# Patient Record
Sex: MALE | Race: WHITE | Employment: OTHER | URBAN - METROPOLITAN AREA
[De-identification: names, ages, dates, MRNs, and addresses within clinical notes are randomized per-mention and may not be internally consistent; named-entity substitution may affect disease eponyms.]

---

## 2017-01-27 ENCOUNTER — OFFICE VISIT (OUTPATIENT)
Dept: INTERNAL MEDICINE | Age: 77
End: 2017-01-27

## 2017-01-27 VITALS
WEIGHT: 263 LBS | BODY MASS INDEX: 37.65 KG/M2 | HEIGHT: 70 IN | DIASTOLIC BLOOD PRESSURE: 80 MMHG | RESPIRATION RATE: 13 BRPM | HEART RATE: 70 BPM | SYSTOLIC BLOOD PRESSURE: 122 MMHG

## 2017-01-27 DIAGNOSIS — I10 ESSENTIAL HYPERTENSION: Primary | ICD-10-CM

## 2017-01-27 DIAGNOSIS — R27.0 ATAXIA: ICD-10-CM

## 2017-01-27 DIAGNOSIS — E78.5 HYPERLIPIDEMIA, UNSPECIFIED HYPERLIPIDEMIA TYPE: ICD-10-CM

## 2017-01-27 DIAGNOSIS — E11.9 TYPE 2 DIABETES MELLITUS WITHOUT COMPLICATION, WITHOUT LONG-TERM CURRENT USE OF INSULIN (HCC): ICD-10-CM

## 2017-01-27 DIAGNOSIS — J98.6 DIAPHRAGM DYSFUNCTION: ICD-10-CM

## 2017-01-27 PROCEDURE — G8510 SCR DEP NEG, NO PLAN REQD: HCPCS | Performed by: INTERNAL MEDICINE

## 2017-01-27 PROCEDURE — 99213 OFFICE O/P EST LOW 20 MIN: CPT | Performed by: INTERNAL MEDICINE

## 2017-01-27 PROCEDURE — 3288F FALL RISK ASSESSMENT DOCD: CPT | Performed by: INTERNAL MEDICINE

## 2017-01-27 ASSESSMENT — PATIENT HEALTH QUESTIONNAIRE - PHQ9
2. FEELING DOWN, DEPRESSED OR HOPELESS: 0
SUM OF ALL RESPONSES TO PHQ9 QUESTIONS 1 & 2: 0
1. LITTLE INTEREST OR PLEASURE IN DOING THINGS: 0
SUM OF ALL RESPONSES TO PHQ QUESTIONS 1-9: 0

## 2017-01-30 RX ORDER — LORAZEPAM 1 MG/1
1 TABLET ORAL NIGHTLY PRN
Qty: 90 TABLET | Refills: 0 | Status: SHIPPED | OUTPATIENT
Start: 2017-01-30 | End: 2017-09-18 | Stop reason: SDUPTHER

## 2017-03-01 RX ORDER — LISINOPRIL 40 MG/1
TABLET ORAL
Qty: 180 TABLET | Refills: 3 | Status: SHIPPED | OUTPATIENT
Start: 2017-03-01 | End: 2017-10-03 | Stop reason: ALTCHOICE

## 2017-03-01 RX ORDER — EZETIMIBE 10 MG/1
TABLET ORAL
Qty: 90 TABLET | Refills: 3 | Status: SHIPPED | OUTPATIENT
Start: 2017-03-01 | End: 2018-01-31 | Stop reason: SDUPTHER

## 2017-03-01 RX ORDER — TAMSULOSIN HYDROCHLORIDE 0.4 MG/1
0.4 CAPSULE ORAL 2 TIMES DAILY
Qty: 180 CAPSULE | Refills: 3 | Status: SHIPPED | OUTPATIENT
Start: 2017-03-01 | End: 2017-09-18 | Stop reason: ALTCHOICE

## 2017-04-28 ENCOUNTER — OFFICE VISIT (OUTPATIENT)
Dept: INTERNAL MEDICINE | Age: 77
End: 2017-04-28

## 2017-04-28 VITALS
WEIGHT: 261 LBS | BODY MASS INDEX: 37.37 KG/M2 | HEART RATE: 70 BPM | DIASTOLIC BLOOD PRESSURE: 80 MMHG | HEIGHT: 70 IN | SYSTOLIC BLOOD PRESSURE: 130 MMHG | RESPIRATION RATE: 12 BRPM

## 2017-04-28 DIAGNOSIS — Z12.5 SPECIAL SCREENING FOR MALIGNANT NEOPLASM OF PROSTATE: ICD-10-CM

## 2017-04-28 DIAGNOSIS — E78.5 HYPERLIPIDEMIA, UNSPECIFIED HYPERLIPIDEMIA TYPE: ICD-10-CM

## 2017-04-28 DIAGNOSIS — R06.02 SHORTNESS OF BREATH: ICD-10-CM

## 2017-04-28 DIAGNOSIS — I10 ESSENTIAL HYPERTENSION: ICD-10-CM

## 2017-04-28 DIAGNOSIS — E11.9 TYPE 2 DIABETES MELLITUS WITHOUT COMPLICATION, WITHOUT LONG-TERM CURRENT USE OF INSULIN (HCC): ICD-10-CM

## 2017-04-28 DIAGNOSIS — M79.89 BILATERAL SWELLING OF FEET: Primary | ICD-10-CM

## 2017-04-28 LAB
CREATININE URINE: 86.3 MG/DL (ref 39–259)
MICROALBUMIN UR-MCNC: 11.8 MG/DL
MICROALBUMIN/CREAT UR-RTO: 136.7 MG/G (ref 0–30)

## 2017-04-28 PROCEDURE — 99214 OFFICE O/P EST MOD 30 MIN: CPT | Performed by: INTERNAL MEDICINE

## 2017-05-08 ENCOUNTER — HOSPITAL ENCOUNTER (OUTPATIENT)
Dept: NON INVASIVE DIAGNOSTICS | Age: 77
Discharge: HOME | End: 2017-05-08
Attending: INTERNAL MEDICINE | Admitting: INTERNAL MEDICINE

## 2017-05-08 DIAGNOSIS — R06.02 SHORTNESS OF BREATH: ICD-10-CM

## 2017-05-08 LAB
LV EF: 58 %
LVEF MODALITY: NORMAL

## 2017-05-12 ENCOUNTER — HOSPITAL ENCOUNTER (OUTPATIENT)
Dept: OTHER | Age: 77
Discharge: HOME | End: 2017-05-12
Attending: INTERNAL MEDICINE | Admitting: INTERNAL MEDICINE

## 2017-05-12 LAB
ANION GAP SERPL CALCULATED.3IONS-SCNC: 12 MMOL/L (ref 3–16)
BUN BLDV-MCNC: 27 MG/DL (ref 7–20)
CALCIUM SERPL-MCNC: 10.7 MG/DL (ref 8.3–10.6)
CHLORIDE BLD-SCNC: 101 MMOL/L (ref 99–110)
CO2: 27 MMOL/L (ref 21–32)
CREAT SERPL-MCNC: 1 MG/DL (ref 0.8–1.3)
GFR AFRICAN AMERICAN: >60
GFR NON-AFRICAN AMERICAN: >60
GLUCOSE BLD-MCNC: 138 MG/DL (ref 70–99)
POTASSIUM SERPL-SCNC: 4.8 MMOL/L (ref 3.5–5.1)
PROSTATE SPECIFIC ANTIGEN: 1.36 NG/ML (ref 0–4)
SODIUM BLD-SCNC: 140 MMOL/L (ref 136–145)

## 2017-05-13 LAB
ESTIMATED AVERAGE GLUCOSE: 180 MG/DL
HBA1C MFR BLD: 7.9 %

## 2017-06-01 ENCOUNTER — TELEPHONE (OUTPATIENT)
Dept: INTERNAL MEDICINE | Age: 77
End: 2017-06-01

## 2017-06-01 RX ORDER — ATORVASTATIN CALCIUM 80 MG/1
80 TABLET, FILM COATED ORAL DAILY
Qty: 90 TABLET | Refills: 3 | Status: SHIPPED | OUTPATIENT
Start: 2017-06-01 | End: 2018-04-19 | Stop reason: SDUPTHER

## 2017-07-10 ENCOUNTER — TELEPHONE (OUTPATIENT)
Dept: INTERNAL MEDICINE | Age: 77
End: 2017-07-10

## 2017-09-18 ENCOUNTER — OFFICE VISIT (OUTPATIENT)
Dept: INTERNAL MEDICINE | Age: 77
End: 2017-09-18

## 2017-09-18 VITALS
DIASTOLIC BLOOD PRESSURE: 70 MMHG | WEIGHT: 271 LBS | BODY MASS INDEX: 38.8 KG/M2 | SYSTOLIC BLOOD PRESSURE: 130 MMHG | HEART RATE: 72 BPM | HEIGHT: 70 IN | RESPIRATION RATE: 12 BRPM

## 2017-09-18 DIAGNOSIS — E11.9 TYPE 2 DIABETES MELLITUS WITHOUT COMPLICATION, WITHOUT LONG-TERM CURRENT USE OF INSULIN (HCC): ICD-10-CM

## 2017-09-18 DIAGNOSIS — R05.9 COUGH: ICD-10-CM

## 2017-09-18 DIAGNOSIS — I10 ESSENTIAL HYPERTENSION: ICD-10-CM

## 2017-09-18 DIAGNOSIS — I65.29 STENOSIS OF CAROTID ARTERY, UNSPECIFIED LATERALITY: ICD-10-CM

## 2017-09-18 DIAGNOSIS — E78.5 HYPERLIPIDEMIA, UNSPECIFIED HYPERLIPIDEMIA TYPE: ICD-10-CM

## 2017-09-18 DIAGNOSIS — R06.02 SOB (SHORTNESS OF BREATH): Primary | ICD-10-CM

## 2017-09-18 PROCEDURE — 99214 OFFICE O/P EST MOD 30 MIN: CPT | Performed by: INTERNAL MEDICINE

## 2017-09-18 RX ORDER — TADALAFIL 10 MG/1
10 TABLET ORAL PRN
COMMUNITY

## 2017-09-18 RX ORDER — LORAZEPAM 1 MG/1
1 TABLET ORAL NIGHTLY PRN
Qty: 90 TABLET | Refills: 0 | Status: SHIPPED | OUTPATIENT
Start: 2017-09-18 | End: 2018-12-17

## 2017-09-18 RX ORDER — FUROSEMIDE 40 MG/1
40 TABLET ORAL DAILY
Qty: 90 TABLET | Refills: 3 | Status: SHIPPED | OUTPATIENT
Start: 2017-09-18 | End: 2020-06-23 | Stop reason: SDUPTHER

## 2017-09-18 RX ORDER — SILODOSIN 8 MG/1
8 CAPSULE ORAL EVERY EVENING
COMMUNITY

## 2017-09-19 ENCOUNTER — HOSPITAL ENCOUNTER (OUTPATIENT)
Dept: VASCULAR LAB | Age: 77
Discharge: HOME | End: 2017-09-19
Attending: INTERNAL MEDICINE | Admitting: INTERNAL MEDICINE

## 2017-09-19 ENCOUNTER — HOSPITAL ENCOUNTER (OUTPATIENT)
Dept: OTHER | Age: 77
Discharge: HOME | End: 2017-09-19
Attending: INTERNAL MEDICINE | Admitting: INTERNAL MEDICINE

## 2017-09-19 DIAGNOSIS — I65.29 STENOSIS OF CAROTID ARTERY, UNSPECIFIED LATERALITY: Primary | ICD-10-CM

## 2017-09-19 DIAGNOSIS — I65.29 OCCLUSION AND STENOSIS OF UNSPECIFIED CAROTID ARTERY: ICD-10-CM

## 2017-09-19 DIAGNOSIS — R05.9 COUGH: ICD-10-CM

## 2017-09-19 DIAGNOSIS — R06.02 SOB (SHORTNESS OF BREATH): ICD-10-CM

## 2017-09-19 LAB
ALT SERPL-CCNC: 23 U/L (ref 10–40)
ANION GAP SERPL CALCULATED.3IONS-SCNC: 9 MMOL/L (ref 3–16)
AST SERPL-CCNC: 20 U/L (ref 15–37)
BUN BLDV-MCNC: 22 MG/DL (ref 7–20)
CALCIUM SERPL-MCNC: 10.2 MG/DL (ref 8.3–10.6)
CHLORIDE BLD-SCNC: 103 MMOL/L (ref 99–110)
CHOLESTEROL, FASTING: 129 MG/DL (ref 0–199)
CO2: 28 MMOL/L (ref 21–32)
CREAT SERPL-MCNC: 0.9 MG/DL (ref 0.8–1.3)
GFR AFRICAN AMERICAN: >60
GFR NON-AFRICAN AMERICAN: >60
GLUCOSE FASTING: 132 MG/DL (ref 70–99)
HDLC SERPL-MCNC: 44 MG/DL (ref 40–60)
LDL CHOLESTEROL CALCULATED: 68 MG/DL
POTASSIUM SERPL-SCNC: 4.7 MMOL/L (ref 3.5–5.1)
SODIUM BLD-SCNC: 140 MMOL/L (ref 136–145)
TOTAL CK: 84 U/L (ref 39–308)
TRIGLYCERIDE, FASTING: 86 MG/DL (ref 0–150)
VLDLC SERPL CALC-MCNC: 17 MG/DL

## 2017-09-20 LAB
ESTIMATED AVERAGE GLUCOSE: 168.6 MG/DL
HBA1C MFR BLD: 7.5 %

## 2017-10-03 ENCOUNTER — OFFICE VISIT (OUTPATIENT)
Dept: CARDIOLOGY CLINIC | Age: 77
End: 2017-10-03

## 2017-10-03 VITALS
HEART RATE: 81 BPM | BODY MASS INDEX: 38.31 KG/M2 | WEIGHT: 267 LBS | SYSTOLIC BLOOD PRESSURE: 150 MMHG | DIASTOLIC BLOOD PRESSURE: 80 MMHG

## 2017-10-03 DIAGNOSIS — I10 ESSENTIAL HYPERTENSION: ICD-10-CM

## 2017-10-03 DIAGNOSIS — E78.5 HYPERLIPIDEMIA, UNSPECIFIED HYPERLIPIDEMIA TYPE: Primary | ICD-10-CM

## 2017-10-03 LAB
BASOPHILS ABSOLUTE: 0 K/UL (ref 0–0.2)
BASOPHILS RELATIVE PERCENT: 0.2 %
EOSINOPHILS ABSOLUTE: 0.1 K/UL (ref 0–0.6)
EOSINOPHILS RELATIVE PERCENT: 1.5 %
HCT VFR BLD CALC: 41.8 % (ref 40.5–52.5)
HEMOGLOBIN: 14.2 G/DL (ref 13.5–17.5)
LYMPHOCYTES ABSOLUTE: 2 K/UL (ref 1–5.1)
LYMPHOCYTES RELATIVE PERCENT: 26.8 %
MCH RBC QN AUTO: 31.5 PG (ref 26–34)
MCHC RBC AUTO-ENTMCNC: 33.9 G/DL (ref 31–36)
MCV RBC AUTO: 93 FL (ref 80–100)
MONOCYTES ABSOLUTE: 0.7 K/UL (ref 0–1.3)
MONOCYTES RELATIVE PERCENT: 9.3 %
NEUTROPHILS ABSOLUTE: 4.6 K/UL (ref 1.7–7.7)
NEUTROPHILS RELATIVE PERCENT: 62.2 %
PDW BLD-RTO: 13.9 % (ref 12.4–15.4)
PLATELET # BLD: 157 K/UL (ref 135–450)
PMV BLD AUTO: 11.9 FL (ref 5–10.5)
RBC # BLD: 4.49 M/UL (ref 4.2–5.9)
T4 TOTAL: 7.5 UG/DL (ref 4.5–10.9)
WBC # BLD: 7.4 K/UL (ref 4–11)

## 2017-10-03 PROCEDURE — 93000 ELECTROCARDIOGRAM COMPLETE: CPT | Performed by: INTERNAL MEDICINE

## 2017-10-03 PROCEDURE — 99213 OFFICE O/P EST LOW 20 MIN: CPT | Performed by: INTERNAL MEDICINE

## 2017-10-03 NOTE — MR AVS SNAPSHOT
After Visit Summary             Сергей Belle   10/3/2017 10:45 AM   Office Visit    Description:  Male : 1940   Provider:  Ginette Candelario MD   Department:  [address] and Future Appointments         Below is a list of your follow-up and future appointments. This may not be a complete list as you may have made appointments directly with providers that we are not aware of or your providers may have made some for you. Please call your providers to confirm appointments. It is important to keep your appointments. Please bring your current insurance card, photo ID, co-pay, and all medication bottles to your appointment. If self-pay, payment is expected at the time of service. Your To-Do List     Future Appointments Provider Department Dept Phone    2017 10:15 AM Ginette Candelario, [address] [phone]    Please arrive 15 minutes prior to appointment, bring photo ID and insurance card. Information from Your Visit        Department     Name Address Phone Fax    [address]  [place] [address] [phone]      You Were Seen for:         Comments    Hyperlipidemia, unspecified hyperlipidemia type   [[id number]]         Vital Signs     Blood Pressure Pulse Weight Body Mass Index Smoking Status       150/80 81 267 lb (121.1 kg) 38.31 kg/m2 Former Smoker       Additional Information about your Body Mass Index (BMI)           Your BMI as listed above is considered obese (30 or more). BMI is an estimate of body fat, calculated from your height and weight. The higher your BMI, the greater your risk of heart disease, high blood pressure, type 2 diabetes, stroke, gallstones, arthritis, sleep apnea, and certain cancers. BMI is not perfect. It may overestimate body fat in athletes and people who are more muscular.   Even a small weight loss (between 5 and 10 Basic Information     Date Of Birth Sex Race Ethnicity Preferred Language    1940 Male White Non-/Non  English      Problem List as of 10/3/2017  Date Reviewed: 9/18/2017                Occlusion and stenosis of carotid artery without mention of cerebral infarction    Diaphragm dysfunction    Type 2 diabetes mellitus without complication ([facility] [address]    Hypertension    Hyperlipidemia      Immunizations as of 10/3/2017     Name Date    Influenza Vaccine, unspecified formulation 12/20/2015    Influenza, High Dose 10/15/2016    Pneumococcal 13-valent Conjugate (Asuziec32) 1/23/2017    Pneumococcal Polysaccharide ([id number]) 12/6/2010    Td 12/9/2005    Zoster 3/4/2013      Preventive Care        Date Due    Tetanus Combination Vaccine (1 - Tdap) 12/10/2005    Yearly Flu Vaccine (1) 9/1/2017    Colonoscopy 11/1/2017    Cholesterol Screening 9/19/2022            MyChart Signup           [employer] allows you to send messages to your doctor, view your test results, renew your prescriptions, schedule appointments, view visit notes, and more. How Do I Sign Up? 1. In your Internet browser, go to [email or web address]  2. Click on the Sign Up Now link in the Sign In box. You will see the New Member Sign Up page. 3. Enter your [employer] Access Code exactly as it appears below. You will not need to use this code after youve completed the sign-up process. If you do not sign up before the expiration date, you must request a new code. [employer] Access Code: [email or web address]  Expires: 11/17/2017  2:57 PM    4. Enter your Social Security Number ([id number]) and Date of Birth (mm/dd/yyyy) as indicated and click Submit. You will be taken to the next sign-up page. 5. Create a [employer] ID. This will be your [employer] login ID and cannot be changed, so think of one that is secure and easy to remember. 6. Create a [employer] password. You can change your password at any time. 7. Enter your Password Reset Question and Answer. This can be used at a later time if you forget your password. 8. Enter your e-mail address. You will receive e-mail notification when new information is available in [address] 9. Click Sign Up. You can now view your medical record. Additional Information  If you have questions, please contact the physician practice where you receive care. Remember, [employer] is NOT to be used for urgent needs. For medical emergencies, dial 911. For questions regarding your [employer] account call [phone]. If you have a clinical question, please call your doctor's office.

## 2017-10-04 RX ORDER — LOSARTAN POTASSIUM 100 MG/1
100 TABLET ORAL DAILY
Qty: 30 TABLET | Refills: 3 | Status: SHIPPED | OUTPATIENT
Start: 2017-10-04 | End: 2017-11-14 | Stop reason: SDUPTHER

## 2017-10-17 NOTE — PROGRESS NOTES
[address]     Outpatient Follow Up Note    Subjective:   CHIEF COMPLAINT / HPI:  Dyspnea     Сергей Belle  presents today  for evaluation of dyspnea. He is active but has to stop to catch his breath at times. This is not really associated with chest pain. Multiple risks for CAD. Prior stress test 2 years ago and essentially normal echo this year. He is overweight and BS control is erratic. Past Medical History:    Past Medical History:   Diagnosis Date    Allergic rhinitis     Arthritis     Atrophic kidney     left    Basal cell carcinoma 2014    Dr. Bang Daily - left cheek    Diaphragm dysfunction 2014    By echocardiogram    Diverticulosis     GERD (gastroesophageal reflux disease)     Hemorrhoids     Hyperlipidemia     Hypertension     Hypertension     Internal carotid artery stenosis 2014    R - 80-99%%, L - < 50%    NIDDM (non-insulin dependent diabetes mellitus)     Raynaud phenomenon     Sleep apnea     does not use CPAP       Social History:    History   Smoking Status    Former Smoker    Packs/day: 3.00    Years: 20.00    Types: Cigarettes    Quit date: 1979   Smokeless Tobacco    Never Used         Family History   Problem Relation Age of Onset    Other Father 68     , CVA.  Other Mother 80     , dementia, pernicious anemia.        Current Medications:  Current Outpatient Prescriptions on File Prior to Visit   Medication Sig Dispense Refill    silodosin (RAPAFLO) 8 MG CAPS Take 8 mg by mouth every evening      tadalafil (CIALIS) 10 MG tablet Take 10 mg by mouth as needed for Erectile Dysfunction      LORazepam (ATIVAN) 1 MG tablet Take 1 tablet by mouth nightly as needed (once nightly) 90 tablet 0    furosemide (LASIX) 40 MG tablet Take 1 tablet by mouth daily 90 tablet 3    atorvastatin (LIPITOR) 80 MG tablet Take 1 tablet by mouth daily 90 tablet 3    ezetimibe (ZETIA) 10 MG tablet TAKE 1 TABLET BY MOUTH     EVERY DAY 90

## 2017-10-23 RX ORDER — LORAZEPAM 1 MG/1
1 TABLET ORAL NIGHTLY PRN
Qty: 90 TABLET | Refills: 0 | Status: CANCELLED | OUTPATIENT
Start: 2017-10-23

## 2017-11-14 ENCOUNTER — OFFICE VISIT (OUTPATIENT)
Dept: CARDIOLOGY CLINIC | Age: 77
End: 2017-11-14

## 2017-11-14 VITALS
BODY MASS INDEX: 37.74 KG/M2 | DIASTOLIC BLOOD PRESSURE: 80 MMHG | HEART RATE: 60 BPM | WEIGHT: 263 LBS | SYSTOLIC BLOOD PRESSURE: 112 MMHG

## 2017-11-14 DIAGNOSIS — I10 ESSENTIAL HYPERTENSION: ICD-10-CM

## 2017-11-14 DIAGNOSIS — I65.29 OCCLUSION AND STENOSIS OF CAROTID ARTERY: Primary | ICD-10-CM

## 2017-11-14 DIAGNOSIS — E78.5 HYPERLIPIDEMIA, UNSPECIFIED HYPERLIPIDEMIA TYPE: ICD-10-CM

## 2017-11-14 LAB
ALBUMIN SERPL-MCNC: 4 G/DL (ref 3.4–5)
ANION GAP SERPL CALCULATED.3IONS-SCNC: 10 MMOL/L (ref 3–16)
BASOPHILS ABSOLUTE: 0 K/UL (ref 0–0.2)
BASOPHILS RELATIVE PERCENT: 0.2 %
BUN BLDV-MCNC: 24 MG/DL (ref 7–20)
CALCIUM SERPL-MCNC: 10.7 MG/DL (ref 8.3–10.6)
CHLORIDE BLD-SCNC: 99 MMOL/L (ref 99–110)
CO2: 31 MMOL/L (ref 21–32)
CREAT SERPL-MCNC: 0.8 MG/DL (ref 0.8–1.3)
EOSINOPHILS ABSOLUTE: 0.1 K/UL (ref 0–0.6)
EOSINOPHILS RELATIVE PERCENT: 1.4 %
GFR AFRICAN AMERICAN: >60
GFR NON-AFRICAN AMERICAN: >60
GLUCOSE BLD-MCNC: 146 MG/DL (ref 70–99)
HCT VFR BLD CALC: 42.3 % (ref 40.5–52.5)
HEMOGLOBIN: 14.2 G/DL (ref 13.5–17.5)
LYMPHOCYTES ABSOLUTE: 2.1 K/UL (ref 1–5.1)
LYMPHOCYTES RELATIVE PERCENT: 28.1 %
MCH RBC QN AUTO: 31.2 PG (ref 26–34)
MCHC RBC AUTO-ENTMCNC: 33.7 G/DL (ref 31–36)
MCV RBC AUTO: 92.6 FL (ref 80–100)
MONOCYTES ABSOLUTE: 0.5 K/UL (ref 0–1.3)
MONOCYTES RELATIVE PERCENT: 7.1 %
NEUTROPHILS ABSOLUTE: 4.7 K/UL (ref 1.7–7.7)
NEUTROPHILS RELATIVE PERCENT: 63.2 %
PDW BLD-RTO: 13.9 % (ref 12.4–15.4)
PHOSPHORUS: 2.8 MG/DL (ref 2.5–4.9)
PLATELET # BLD: 145 K/UL (ref 135–450)
PMV BLD AUTO: 11.8 FL (ref 5–10.5)
POTASSIUM SERPL-SCNC: 4.9 MMOL/L (ref 3.5–5.1)
RBC # BLD: 4.56 M/UL (ref 4.2–5.9)
SODIUM BLD-SCNC: 140 MMOL/L (ref 136–145)
T4 TOTAL: 7.2 UG/DL (ref 4.5–10.9)
WBC # BLD: 7.4 K/UL (ref 4–11)

## 2017-11-14 PROCEDURE — 99213 OFFICE O/P EST LOW 20 MIN: CPT | Performed by: INTERNAL MEDICINE

## 2017-11-15 DIAGNOSIS — I10 ESSENTIAL HYPERTENSION: ICD-10-CM

## 2017-11-15 DIAGNOSIS — E78.5 HYPERLIPIDEMIA, UNSPECIFIED HYPERLIPIDEMIA TYPE: ICD-10-CM

## 2017-11-15 DIAGNOSIS — I65.29 OCCLUSION AND STENOSIS OF CAROTID ARTERY: ICD-10-CM

## 2017-11-15 RX ORDER — LOSARTAN POTASSIUM 100 MG/1
100 TABLET ORAL DAILY
Qty: 30 TABLET | Refills: 3 | Status: SHIPPED | OUTPATIENT
Start: 2017-11-15 | End: 2017-11-16 | Stop reason: SDUPTHER

## 2017-11-15 RX ORDER — LOSARTAN POTASSIUM 100 MG/1
100 TABLET ORAL DAILY
Qty: 90 TABLET | Refills: 3 | Status: CANCELLED | OUTPATIENT
Start: 2017-11-15

## 2017-11-16 DIAGNOSIS — I65.29 OCCLUSION AND STENOSIS OF CAROTID ARTERY: ICD-10-CM

## 2017-11-16 DIAGNOSIS — E78.5 HYPERLIPIDEMIA, UNSPECIFIED HYPERLIPIDEMIA TYPE: ICD-10-CM

## 2017-11-16 DIAGNOSIS — I10 ESSENTIAL HYPERTENSION: ICD-10-CM

## 2017-11-16 RX ORDER — LOSARTAN POTASSIUM 100 MG/1
100 TABLET ORAL DAILY
Qty: 90 TABLET | Refills: 3 | Status: SHIPPED | OUTPATIENT
Start: 2017-11-16

## 2018-01-03 ENCOUNTER — TELEPHONE (OUTPATIENT)
Dept: CARDIOLOGY CLINIC | Age: 78
End: 2018-01-03

## 2018-01-09 ENCOUNTER — OFFICE VISIT (OUTPATIENT)
Dept: INTERNAL MEDICINE | Age: 78
End: 2018-01-09

## 2018-01-09 VITALS
BODY MASS INDEX: 39.08 KG/M2 | SYSTOLIC BLOOD PRESSURE: 132 MMHG | RESPIRATION RATE: 12 BRPM | WEIGHT: 273 LBS | HEIGHT: 70 IN | HEART RATE: 70 BPM | DIASTOLIC BLOOD PRESSURE: 70 MMHG

## 2018-01-09 DIAGNOSIS — I10 ESSENTIAL HYPERTENSION: ICD-10-CM

## 2018-01-09 DIAGNOSIS — E11.9 TYPE 2 DIABETES MELLITUS WITHOUT COMPLICATION, WITHOUT LONG-TERM CURRENT USE OF INSULIN (HCC): ICD-10-CM

## 2018-01-09 DIAGNOSIS — G89.29 CHRONIC RIGHT-SIDED LOW BACK PAIN WITH RIGHT-SIDED SCIATICA: Primary | ICD-10-CM

## 2018-01-09 DIAGNOSIS — M54.41 CHRONIC RIGHT-SIDED LOW BACK PAIN WITH RIGHT-SIDED SCIATICA: Primary | ICD-10-CM

## 2018-01-09 PROCEDURE — 99213 OFFICE O/P EST LOW 20 MIN: CPT | Performed by: INTERNAL MEDICINE

## 2018-01-09 RX ORDER — DOCUSATE SODIUM 100 MG/1
100 CAPSULE, LIQUID FILLED ORAL 2 TIMES DAILY
COMMUNITY

## 2018-01-09 RX ORDER — EZETIMIBE 10 MG/1
10 TABLET ORAL DAILY
COMMUNITY
End: 2018-02-01

## 2018-01-09 NOTE — PROGRESS NOTES
Chief Complaint   Patient presents with    Hypertension        HPI:  Has had chronic right low back pain for years. MRIs have been done in 2016 and 2017 and eventually was referred to Dr. Britney Sinclair for consideration of surgery. They did not like him so they went to [address] [place] [address] where apparently he was approved for surgery but advised that medicare did not cover it and it would cost him at least $14,000 per side. He had an acute flare and was seen in the emergency room and was treated with dilaudid, zofran, followed by a medrol dosepack with some improvement. He saw Dr. Leonard Guillermo because of shortness of breath and edema and was given lasix 40 mgs daily which completely caused disappearance of all his symptoms but he discontinued the lasix because his wife thought \"the dose was too strong\" and thereafter his edema returned. He then saw Dr. Grace Lam at [facility]. His flomax was discontinued and replaced by rapaflo and daily cialis with marked improvement in the force of his urinary stream but no improvement in dribbling, nocturia, frequency, or urgency. Social History     Social History    Marital status:      Spouse name: Melissa    Number of children: 0    Years of education: N/A     Occupational History    Not on file. Social History Main Topics    Smoking status: Former Smoker     Packs/day: 3.00     Years: 20.00     Types: Cigarettes     Quit date: 1/1/1979    Smokeless tobacco: Never Used    Alcohol use 0.0 oz/week      Comment: 2 drinks per day- vodka    Drug use: Unknown    Sexual activity: Not on file     Other Topics Concern    Not on file     Social History Narrative    Living Will:  Yes.              Patient Active Problem List   Diagnosis    Hyperlipidemia    Hypertension    Type 2 diabetes mellitus without complication ([facility] [address]    Ataxia    Diaphragm dysfunction    Occlusion and stenosis of carotid artery     Past Medical History:   Diagnosis Date    Allergic rhinitis     Arthritis     Atrophic kidney     left    Basal cell carcinoma Feb., 2014    Dr. Tushar Norton - left cheek    Diaphragm dysfunction Apr., 2014    By echocardiogram    Diverticulosis     GERD (gastroesophageal reflux disease)     Hemorrhoids     Hyperlipidemia     Hypertension     Hypertension     Internal carotid artery stenosis Apr., 2014    R - 80-99%%, L - < 50%    NIDDM (non-insulin dependent diabetes mellitus)     Raynaud phenomenon     Sleep apnea     does not use CPAP     Past Surgical History:   Procedure Laterality Date    CAROTID ENDARTERECTOMY  Jan.14, 2015    Dr. Brent Michael - right    COLONOSCOPY  Nov 2007 ( 2017 )    Dr. Devin Solitario, diverticulitis, hemorrhoids.  CYST REMOVAL  1994    back cyst excised    INTRACAPSULAR CATARACT EXTRACTION  June, 2014    Dr. Spencer Burdick - left   Kenith Gale EXTRACTION  June, 2014    Dr. Spencer Burdick - right   [address]  2006    excision benign spindle cell lipoma    MOHS SURGERY  Feb. 28, 2014    Dr. Tushar Norton - basal cell of left cheek    NASAL SEPTUM SURGERY  Nov., 2004    septal repair, polypectomy    TOTAL HIP ARTHROPLASTY  Aug., 2008    Dr. Ольга Parsons - right    UPPER GASTROINTESTINAL ENDOSCOPY  Sept., 2005    reflux w/esophageal ring (dilated)     Current Outpatient Prescriptions   Medication Sig Dispense Refill    aspirin 81 MG tablet Take 81 mg by mouth daily      docusate sodium (COLACE) 100 MG capsule Take 100 mg by mouth 2 times daily      ezetimibe (ZETIA) 10 MG tablet Take 10 mg by mouth daily      losartan (COZAAR) 100 MG tablet Take 1 tablet by mouth daily 90 tablet 3    insulin glargine (LANTUS) 100 UNIT/ML injection pen Increase by 1 unit nightly until FBS of 110. Patient will need needles/supplies for his solostar.   65 units nightly 30 Pen 3    silodosin (RAPAFLO) 8 MG CAPS Take 8 mg by mouth every evening      tadalafil (CIALIS) 10 MG tablet Take 10 mg by mouth as needed for Erectile Dysfunction      LORazepam (ATIVAN)

## 2018-01-31 RX ORDER — EZETIMIBE 10 MG/1
TABLET ORAL
Qty: 90 TABLET | Refills: 3 | Status: SHIPPED | OUTPATIENT
Start: 2018-01-31 | End: 2018-02-05 | Stop reason: SDUPTHER

## 2018-02-01 ENCOUNTER — OFFICE VISIT (OUTPATIENT)
Dept: CARDIOLOGY CLINIC | Age: 78
End: 2018-02-01

## 2018-02-01 VITALS
HEART RATE: 64 BPM | WEIGHT: 271 LBS | BODY MASS INDEX: 38.88 KG/M2 | SYSTOLIC BLOOD PRESSURE: 142 MMHG | DIASTOLIC BLOOD PRESSURE: 76 MMHG

## 2018-02-01 DIAGNOSIS — I10 ESSENTIAL HYPERTENSION: Primary | ICD-10-CM

## 2018-02-01 PROCEDURE — 99213 OFFICE O/P EST LOW 20 MIN: CPT | Performed by: INTERNAL MEDICINE

## 2018-02-02 NOTE — PROGRESS NOTES
normal in amplitude and contour without delay or bruit  JVP is not elevated  ABDOMEN:  Normal bowel sounds, non-distended and non-tender to palpation. No liver or spleen enlargement. EXT: No edema, no calf tenderness. Pulses are present bilaterally. DATA:    Lab Results   Component Value Date    ALT 23 09/19/2017    AST 20 09/19/2017    ALKPHOS 71 05/17/2016    BILITOT 0.5 05/17/2016     Lab Results   Component Value Date    CREATININE 0.8 11/14/2017    BUN 24 (H) 11/14/2017     11/14/2017    K 4.9 11/14/2017    CL 99 11/14/2017    CO2 31 11/14/2017     Lab Results   Component Value Date    TSH 2.34 05/17/2016    U5JNEEA 7.2 11/14/2017     Lab Results   Component Value Date    WBC 7.4 11/14/2017    HGB 14.2 11/14/2017    HCT 42.3 11/14/2017    MCV 92.6 11/14/2017     11/14/2017     No components found for: CHLPL  Lab Results   Component Value Date    TRIG 91 11/08/2016    TRIG 122 05/17/2016    TRIG 77 11/30/2015     Lab Results   Component Value Date    HDL 44 09/19/2017    HDL 48 11/08/2016    HDL 39 05/17/2016     Lab Results   Component Value Date    LDLCALC 68 09/19/2017    LDLCALC 75 11/08/2016    LDLCALC 66 05/17/2016     Lab Results   Component Value Date    LABVLDL 17 09/19/2017    LABVLDL 18 11/08/2016    LABVLDL 32 04/05/2014     Radiology Review:  Pertinent images / reports were reviewed as a part of this visit and reveals the following:    Last Echo:     Last Stress Test     Last Cath     Last ECG: Sinus RBBB  Assessment /  Plan   Dyspnea   Stable    Hypertension  Better control          DM  Fatigue    No med changes            [facility]  I attest that this note was completed entirely by me

## 2018-02-05 RX ORDER — EZETIMIBE 10 MG/1
TABLET ORAL
Qty: 90 TABLET | Refills: 3 | Status: SHIPPED | OUTPATIENT
Start: 2018-02-05 | End: 2019-04-11 | Stop reason: SDUPTHER

## 2018-02-12 ENCOUNTER — TELEPHONE (OUTPATIENT)
Dept: CARDIOLOGY CLINIC | Age: 78
End: 2018-02-12

## 2018-04-19 ENCOUNTER — TELEPHONE (OUTPATIENT)
Dept: INTERNAL MEDICINE | Age: 78
End: 2018-04-19

## 2018-04-19 DIAGNOSIS — E11.9 TYPE 2 DIABETES MELLITUS WITHOUT COMPLICATION, WITHOUT LONG-TERM CURRENT USE OF INSULIN (HCC): ICD-10-CM

## 2018-04-19 DIAGNOSIS — E78.5 HYPERLIPIDEMIA, UNSPECIFIED HYPERLIPIDEMIA TYPE: Primary | ICD-10-CM

## 2018-04-19 RX ORDER — ATORVASTATIN CALCIUM 80 MG/1
TABLET, FILM COATED ORAL
Qty: 90 TABLET | Refills: 3 | Status: SHIPPED | OUTPATIENT
Start: 2018-04-19 | End: 2019-07-08 | Stop reason: SDUPTHER

## 2018-04-24 ENCOUNTER — HOSPITAL ENCOUNTER (OUTPATIENT)
Dept: OTHER | Age: 78
Discharge: HOME | End: 2018-04-24
Attending: INTERNAL MEDICINE | Admitting: INTERNAL MEDICINE

## 2018-04-24 LAB
ALT SERPL-CCNC: 24 U/L (ref 10–40)
ANION GAP SERPL CALCULATED.3IONS-SCNC: 11 MMOL/L (ref 3–16)
AST SERPL-CCNC: 21 U/L (ref 15–37)
BUN BLDV-MCNC: 26 MG/DL (ref 7–20)
CALCIUM SERPL-MCNC: 10.7 MG/DL (ref 8.3–10.6)
CHLORIDE BLD-SCNC: 102 MMOL/L (ref 99–110)
CHOLESTEROL, FASTING: 134 MG/DL (ref 0–199)
CO2: 30 MMOL/L (ref 21–32)
CREAT SERPL-MCNC: 0.8 MG/DL (ref 0.8–1.3)
GFR AFRICAN AMERICAN: >60
GFR NON-AFRICAN AMERICAN: >60
GLUCOSE FASTING: 96 MG/DL (ref 70–99)
HDLC SERPL-MCNC: 44 MG/DL (ref 40–60)
LDL CHOLESTEROL CALCULATED: 70 MG/DL
POTASSIUM SERPL-SCNC: 4.7 MMOL/L (ref 3.5–5.1)
SODIUM BLD-SCNC: 143 MMOL/L (ref 136–145)
TOTAL CK: 91 U/L (ref 39–308)
TRIGLYCERIDE, FASTING: 102 MG/DL (ref 0–150)
VLDLC SERPL CALC-MCNC: 20 MG/DL

## 2018-04-25 LAB
ESTIMATED AVERAGE GLUCOSE: 200.1 MG/DL
HBA1C MFR BLD: 8.6 %

## 2018-05-31 RX ORDER — BUSPIRONE HYDROCHLORIDE 5 MG/1
5 TABLET ORAL 4 TIMES DAILY
COMMUNITY
End: 2018-05-31 | Stop reason: SDUPTHER

## 2018-05-31 RX ORDER — BUSPIRONE HYDROCHLORIDE 5 MG/1
5 TABLET ORAL 4 TIMES DAILY PRN
Qty: 60 TABLET | Refills: 12 | Status: SHIPPED | OUTPATIENT
Start: 2018-05-31

## 2018-06-26 ENCOUNTER — TELEPHONE (OUTPATIENT)
Dept: INTERNAL MEDICINE | Age: 78
End: 2018-06-26

## 2018-07-03 DIAGNOSIS — E78.5 HYPERLIPIDEMIA, UNSPECIFIED HYPERLIPIDEMIA TYPE: ICD-10-CM

## 2018-07-03 DIAGNOSIS — Z13.29 SCREENING FOR THYROID DISORDER: ICD-10-CM

## 2018-07-03 DIAGNOSIS — E11.9 TYPE 2 DIABETES MELLITUS WITHOUT COMPLICATION, WITHOUT LONG-TERM CURRENT USE OF INSULIN (HCC): Primary | ICD-10-CM

## 2018-07-03 DIAGNOSIS — I10 ESSENTIAL HYPERTENSION: ICD-10-CM

## 2018-07-03 DIAGNOSIS — Z12.5 SPECIAL SCREENING FOR MALIGNANT NEOPLASM OF PROSTATE: ICD-10-CM

## 2018-07-05 ENCOUNTER — HOSPITAL ENCOUNTER (OUTPATIENT)
Dept: OTHER | Age: 78
Discharge: HOME | End: 2018-07-05
Attending: INTERNAL MEDICINE | Admitting: INTERNAL MEDICINE

## 2018-07-05 DIAGNOSIS — E11.9 TYPE 2 DIABETES MELLITUS WITHOUT COMPLICATION, WITHOUT LONG-TERM CURRENT USE OF INSULIN (HCC): ICD-10-CM

## 2018-07-05 DIAGNOSIS — Z13.29 SCREENING FOR THYROID DISORDER: ICD-10-CM

## 2018-07-05 DIAGNOSIS — E78.5 HYPERLIPIDEMIA, UNSPECIFIED HYPERLIPIDEMIA TYPE: ICD-10-CM

## 2018-07-05 DIAGNOSIS — I10 ESSENTIAL HYPERTENSION: ICD-10-CM

## 2018-07-05 DIAGNOSIS — Z12.5 SPECIAL SCREENING FOR MALIGNANT NEOPLASM OF PROSTATE: ICD-10-CM

## 2018-07-05 LAB
A/G RATIO: 1.3 (ref 1.1–2.2)
ALBUMIN SERPL-MCNC: 3.9 G/DL (ref 3.4–5)
ALP BLD-CCNC: 64 U/L (ref 40–129)
ALT SERPL-CCNC: 22 U/L (ref 10–40)
ANION GAP SERPL CALCULATED.3IONS-SCNC: 12 MMOL/L (ref 3–16)
AST SERPL-CCNC: 18 U/L (ref 15–37)
BASOPHILS ABSOLUTE: 0 K/UL (ref 0–0.2)
BASOPHILS RELATIVE PERCENT: 0.1 %
BILIRUB SERPL-MCNC: 0.4 MG/DL (ref 0–1)
BUN BLDV-MCNC: 31 MG/DL (ref 7–20)
CALCIUM SERPL-MCNC: 10.6 MG/DL (ref 8.3–10.6)
CHLORIDE BLD-SCNC: 101 MMOL/L (ref 99–110)
CHOLESTEROL, TOTAL: 231 MG/DL (ref 0–199)
CO2: 28 MMOL/L (ref 21–32)
CREAT SERPL-MCNC: 0.9 MG/DL (ref 0.8–1.3)
EOSINOPHILS ABSOLUTE: 0.2 K/UL (ref 0–0.6)
EOSINOPHILS RELATIVE PERCENT: 2.5 %
GFR AFRICAN AMERICAN: >60
GFR NON-AFRICAN AMERICAN: >60
GLOBULIN: 3.1 G/DL
GLUCOSE BLD-MCNC: 167 MG/DL (ref 70–99)
HCT VFR BLD CALC: 42.7 % (ref 40.5–52.5)
HDLC SERPL-MCNC: 37 MG/DL (ref 40–60)
HEMOGLOBIN: 14.3 G/DL (ref 13.5–17.5)
LDL CHOLESTEROL CALCULATED: 149 MG/DL
LYMPHOCYTES ABSOLUTE: 2.3 K/UL (ref 1–5.1)
LYMPHOCYTES RELATIVE PERCENT: 32.9 %
MCH RBC QN AUTO: 31.4 PG (ref 26–34)
MCHC RBC AUTO-ENTMCNC: 33.4 G/DL (ref 31–36)
MCV RBC AUTO: 93.8 FL (ref 80–100)
MONOCYTES ABSOLUTE: 0.5 K/UL (ref 0–1.3)
MONOCYTES RELATIVE PERCENT: 7.8 %
NEUTROPHILS ABSOLUTE: 3.9 K/UL (ref 1.7–7.7)
NEUTROPHILS RELATIVE PERCENT: 56.7 %
PDW BLD-RTO: 13.1 % (ref 12.4–15.4)
PLATELET # BLD: 147 K/UL (ref 135–450)
PMV BLD AUTO: 10.4 FL (ref 5–10.5)
POTASSIUM SERPL-SCNC: 4.7 MMOL/L (ref 3.5–5.1)
PROSTATE SPECIFIC ANTIGEN: 1.33 NG/ML (ref 0–4)
RBC # BLD: 4.56 M/UL (ref 4.2–5.9)
SODIUM BLD-SCNC: 141 MMOL/L (ref 136–145)
TOTAL CK: 66 U/L (ref 39–308)
TOTAL PROTEIN: 7 G/DL (ref 6.4–8.2)
TRIGL SERPL-MCNC: 224 MG/DL (ref 0–150)
TSH SERPL DL<=0.05 MIU/L-ACNC: 2.34 UIU/ML (ref 0.27–4.2)
VLDLC SERPL CALC-MCNC: 45 MG/DL
WBC # BLD: 6.9 K/UL (ref 4–11)

## 2018-07-06 LAB
ESTIMATED AVERAGE GLUCOSE: 185.8 MG/DL
HBA1C MFR BLD: 8.1 %

## 2018-07-10 ENCOUNTER — OFFICE VISIT (OUTPATIENT)
Dept: INTERNAL MEDICINE | Age: 78
End: 2018-07-10

## 2018-07-10 VITALS
SYSTOLIC BLOOD PRESSURE: 132 MMHG | HEART RATE: 72 BPM | RESPIRATION RATE: 12 BRPM | WEIGHT: 268 LBS | HEIGHT: 71 IN | BODY MASS INDEX: 37.52 KG/M2 | DIASTOLIC BLOOD PRESSURE: 84 MMHG

## 2018-07-10 DIAGNOSIS — I10 ESSENTIAL HYPERTENSION: ICD-10-CM

## 2018-07-10 DIAGNOSIS — E78.5 HYPERLIPIDEMIA, UNSPECIFIED HYPERLIPIDEMIA TYPE: ICD-10-CM

## 2018-07-10 DIAGNOSIS — Z12.11 SCREENING FOR COLON CANCER: ICD-10-CM

## 2018-07-10 DIAGNOSIS — Z00.00 MEDICARE ANNUAL WELLNESS VISIT, SUBSEQUENT: Primary | ICD-10-CM

## 2018-07-10 DIAGNOSIS — E11.9 TYPE 2 DIABETES MELLITUS WITHOUT COMPLICATION, WITHOUT LONG-TERM CURRENT USE OF INSULIN (HCC): ICD-10-CM

## 2018-07-10 LAB
BILIRUBIN, POC: ABNORMAL
BLOOD URINE, POC: ABNORMAL
CLARITY, POC: CLEAR
COLOR, POC: YELLOW
CREATININE URINE: 103.4 MG/DL (ref 39–259)
GLUCOSE URINE, POC: ABNORMAL
KETONES, POC: ABNORMAL
LEUKOCYTE EST, POC: ABNORMAL
MICROALBUMIN UR-MCNC: 9.7 MG/DL
MICROALBUMIN/CREAT UR-RTO: 93.8 MG/G (ref 0–30)
NITRITE, POC: ABNORMAL
PH, POC: 6
PROTEIN, POC: ABNORMAL
SPECIFIC GRAVITY, POC: 1.02
UROBILINOGEN, POC: ABNORMAL

## 2018-07-10 PROCEDURE — 93000 ELECTROCARDIOGRAM COMPLETE: CPT | Performed by: INTERNAL MEDICINE

## 2018-07-10 PROCEDURE — 81002 URINALYSIS NONAUTO W/O SCOPE: CPT | Performed by: INTERNAL MEDICINE

## 2018-07-10 PROCEDURE — G0439 PPPS, SUBSEQ VISIT: HCPCS | Performed by: INTERNAL MEDICINE

## 2018-07-10 ASSESSMENT — LIFESTYLE VARIABLES
AUDIT TOTAL SCORE: 3
HOW OFTEN DURING THE LAST YEAR HAVE YOU HAD A FEELING OF GUILT OR REMORSE AFTER DRINKING: 0
HOW MANY STANDARD DRINKS CONTAINING ALCOHOL DO YOU HAVE ON A TYPICAL DAY: 0
HAVE YOU OR SOMEONE ELSE BEEN INJURED AS A RESULT OF YOUR DRINKING: 0
HOW OFTEN DURING THE LAST YEAR HAVE YOU BEEN UNABLE TO REMEMBER WHAT HAPPENED THE NIGHT BEFORE BECAUSE YOU HAD BEEN DRINKING: 0
HOW OFTEN DURING THE LAST YEAR HAVE YOU NEEDED AN ALCOHOLIC DRINK FIRST THING IN THE MORNING TO GET YOURSELF GOING AFTER A NIGHT OF HEAVY DRINKING: 0
HOW OFTEN DURING THE LAST YEAR HAVE YOU FOUND THAT YOU WERE NOT ABLE TO STOP DRINKING ONCE YOU HAD STARTED: 0
HAS A RELATIVE, FRIEND, DOCTOR, OR ANOTHER HEALTH PROFESSIONAL EXPRESSED CONCERN ABOUT YOUR DRINKING OR SUGGESTED YOU CUT DOWN: 0
AUDIT-C TOTAL SCORE: 3
HOW OFTEN DO YOU HAVE A DRINK CONTAINING ALCOHOL: 3
HOW OFTEN DURING THE LAST YEAR HAVE YOU FAILED TO DO WHAT WAS NORMALLY EXPECTED FROM YOU BECAUSE OF DRINKING: 0
HOW OFTEN DO YOU HAVE SIX OR MORE DRINKS ON ONE OCCASION: 0

## 2018-07-10 ASSESSMENT — ANXIETY QUESTIONNAIRES: GAD7 TOTAL SCORE: 1

## 2018-07-10 ASSESSMENT — PATIENT HEALTH QUESTIONNAIRE - PHQ9: SUM OF ALL RESPONSES TO PHQ QUESTIONS 1-9: 1

## 2018-07-10 NOTE — PROGRESS NOTES
Rectal:No Masses  Prostate:normal for age, non-tender, enlarged and symmetric  Skin: normal coloration and turgor, no rashes, no suspicious skin lesions noted  Psych: normal    Lab Review:  Hospital Outpatient Visit on 07/05/2018   Component Date Value    WBC 07/05/2018 6.9     RBC 07/05/2018 4.56     Hemoglobin 07/05/2018 14.3     Hematocrit 07/05/2018 42.7     MCV 07/05/2018 93.8     MCH 07/05/2018 31.4     MCHC 07/05/2018 33.4     RDW 07/05/2018 13.1     Platelets 29/14/9701 147     MPV 07/05/2018 10.4     Neutrophils % 07/05/2018 56.7     Lymphocytes % 07/05/2018 32.9     Monocytes % 07/05/2018 7.8     Eosinophils % 07/05/2018 2.5     Basophils % 07/05/2018 0.1     Neutrophils # 07/05/2018 3.9     Lymphocytes # 07/05/2018 2.3     Monocytes # 07/05/2018 0.5     Eosinophils # 07/05/2018 0.2     Basophils # 07/05/2018 0.0     Sodium 07/05/2018 141     Potassium 07/05/2018 4.7     Chloride 07/05/2018 101     CO2 07/05/2018 28     Anion Gap 07/05/2018 12     Glucose 07/05/2018 167*    BUN 07/05/2018 31*    CREATININE 07/05/2018 0.9     GFR Non- 07/05/2018 >60     GFR  07/05/2018 >60     Calcium 07/05/2018 10.6     Total Protein 07/05/2018 7.0     Alb 07/05/2018 3.9     Albumin/Globulin Ratio 07/05/2018 1.3     Total Bilirubin 07/05/2018 0.4     Alkaline Phosphatase 07/05/2018 64     ALT 07/05/2018 22     AST 07/05/2018 18     Globulin 07/05/2018 3.1     Cholesterol, Total 07/05/2018 231*    Triglycerides 07/05/2018 224*    HDL 07/05/2018 37*    LDL Calculated 07/05/2018 149*    VLDL Cholesterol Calcula* 07/05/2018 45     TSH 07/05/2018 2.34     Total CK 07/05/2018 66     PSA 07/05/2018 1.33     Hemoglobin A1C 07/05/2018 8.1     eAG 07/05/2018 185.8        Assessment: Stable    Plan:              Zoster, Tdap, ECG, UA and microalbumin, colonoscopy  Hypertension - well controlled, continue medicine  Hyperlipidemia - labs unacceptable ( stopped his Lipitor one month ago ), resume atotvastatin 1/2 tablet qd  Type 2 diabetes - uncontrolled, stricter adherence to diet, exercise, and weight loss  Pain - ongoing, managed by orthopedics  Falls - ongoing, continue to monitor  Exercise - advised to do 150 minutes of aerobic exercise per week  Advised to get a dilated retinal exam  Advised to get a dental appointment  Hearing - ongoing, continue to monitor  Sybil Deepak received counseling on the following healthy behaviors: nutrition, exercise and medication adherence    Discussed use, benefit, and side effects of prescribed medications. Barriers to medication compliance addressed. All patient questions answered. Pt voiced understanding.    Barriers to success: lack of motivation  Plan for overcoming my barriers: education     Confidence: 1/10  Date goal set: 7/10/18  Date goal attained:          Beatriz Meek MD

## 2018-07-31 ENCOUNTER — TELEPHONE (OUTPATIENT)
Dept: INTERNAL MEDICINE | Age: 78
End: 2018-07-31

## 2018-07-31 NOTE — TELEPHONE ENCOUNTER
Imelda Schneider called   He has a terrible cold - coughing (dry) and hard to breath - feels like congestion is in chest chest that wont come up   Is there anything you can give him or he can get OTC for this?    Allergic to PCN

## 2018-12-17 DIAGNOSIS — G47.00 INSOMNIA, UNSPECIFIED TYPE: Primary | ICD-10-CM

## 2018-12-17 RX ORDER — LORAZEPAM 1 MG/1
1 TABLET ORAL NIGHTLY PRN
Qty: 90 TABLET | Refills: 0 | Status: SHIPPED | OUTPATIENT
Start: 2018-12-17 | End: 2019-01-07 | Stop reason: SDUPTHER

## 2019-01-07 ENCOUNTER — TELEPHONE (OUTPATIENT)
Dept: INTERNAL MEDICINE CLINIC | Age: 79
End: 2019-01-07

## 2019-01-07 DIAGNOSIS — G47.00 INSOMNIA, UNSPECIFIED TYPE: ICD-10-CM

## 2019-01-07 RX ORDER — LORAZEPAM 1 MG/1
1 TABLET ORAL NIGHTLY PRN
Qty: 90 TABLET | Refills: 0 | Status: SHIPPED | OUTPATIENT
Start: 2019-01-07 | End: 2019-04-07

## 2019-04-11 ENCOUNTER — TELEPHONE (OUTPATIENT)
Dept: INTERNAL MEDICINE CLINIC | Age: 79
End: 2019-04-11

## 2019-04-11 DIAGNOSIS — E11.9 TYPE 2 DIABETES MELLITUS WITHOUT COMPLICATION, WITHOUT LONG-TERM CURRENT USE OF INSULIN (HCC): ICD-10-CM

## 2019-04-11 DIAGNOSIS — Z13.29 THYROID DISORDER SCREEN: ICD-10-CM

## 2019-04-11 DIAGNOSIS — E78.5 HYPERLIPIDEMIA, UNSPECIFIED HYPERLIPIDEMIA TYPE: Primary | ICD-10-CM

## 2019-04-11 DIAGNOSIS — I10 ESSENTIAL HYPERTENSION: ICD-10-CM

## 2019-04-11 RX ORDER — EZETIMIBE 10 MG/1
TABLET ORAL
Qty: 90 TABLET | Refills: 3 | Status: SHIPPED | OUTPATIENT
Start: 2019-04-11 | End: 2020-06-23 | Stop reason: SDUPTHER

## 2019-04-11 NOTE — TELEPHONE ENCOUNTER
Juan C Carpenter called   When is he due for blood work?  Also can we include a liver and kidney function

## 2019-05-02 ENCOUNTER — HOSPITAL ENCOUNTER (OUTPATIENT)
Age: 79
Discharge: HOME | End: 2019-05-02
Payer: MEDICARE

## 2019-05-02 DIAGNOSIS — E11.9 TYPE 2 DIABETES MELLITUS WITHOUT COMPLICATION, WITHOUT LONG-TERM CURRENT USE OF INSULIN (HCC): ICD-10-CM

## 2019-05-02 DIAGNOSIS — I10 ESSENTIAL HYPERTENSION: ICD-10-CM

## 2019-05-02 DIAGNOSIS — E78.5 HYPERLIPIDEMIA, UNSPECIFIED HYPERLIPIDEMIA TYPE: ICD-10-CM

## 2019-05-02 DIAGNOSIS — Z13.29 THYROID DISORDER SCREEN: ICD-10-CM

## 2019-05-02 LAB
A/G RATIO: 1.4 (ref 1.1–2.2)
ALBUMIN SERPL-MCNC: 3.8 G/DL (ref 3.4–5)
ALP BLD-CCNC: 68 U/L (ref 40–129)
ALT SERPL-CCNC: 23 U/L (ref 10–40)
ANION GAP SERPL CALCULATED.3IONS-SCNC: 10 MMOL/L (ref 3–16)
AST SERPL-CCNC: 24 U/L (ref 15–37)
BASOPHILS ABSOLUTE: 0 K/UL (ref 0–0.2)
BASOPHILS RELATIVE PERCENT: 0.4 %
BILIRUB SERPL-MCNC: 0.3 MG/DL (ref 0–1)
BUN BLDV-MCNC: 32 MG/DL (ref 7–20)
CALCIUM SERPL-MCNC: 10.4 MG/DL (ref 8.3–10.6)
CHLORIDE BLD-SCNC: 104 MMOL/L (ref 99–110)
CHOLESTEROL, TOTAL: 196 MG/DL (ref 0–199)
CO2: 27 MMOL/L (ref 21–32)
CREAT SERPL-MCNC: 1 MG/DL (ref 0.8–1.3)
EOSINOPHILS ABSOLUTE: 0.1 K/UL (ref 0–0.6)
EOSINOPHILS RELATIVE PERCENT: 1.8 %
GFR AFRICAN AMERICAN: >60
GFR NON-AFRICAN AMERICAN: >60
GLOBULIN: 2.8 G/DL
GLUCOSE BLD-MCNC: 116 MG/DL (ref 70–99)
HCT VFR BLD CALC: 40.5 % (ref 40.5–52.5)
HDLC SERPL-MCNC: 39 MG/DL (ref 40–60)
HEMOGLOBIN: 13.6 G/DL (ref 13.5–17.5)
LDL CHOLESTEROL CALCULATED: 133 MG/DL
LYMPHOCYTES ABSOLUTE: 2 K/UL (ref 1–5.1)
LYMPHOCYTES RELATIVE PERCENT: 31 %
MCH RBC QN AUTO: 32.3 PG (ref 26–34)
MCHC RBC AUTO-ENTMCNC: 33.7 G/DL (ref 31–36)
MCV RBC AUTO: 95.9 FL (ref 80–100)
MONOCYTES ABSOLUTE: 0.6 K/UL (ref 0–1.3)
MONOCYTES RELATIVE PERCENT: 9.3 %
NEUTROPHILS ABSOLUTE: 3.6 K/UL (ref 1.7–7.7)
NEUTROPHILS RELATIVE PERCENT: 57.5 %
PDW BLD-RTO: 13.3 % (ref 12.4–15.4)
PLATELET # BLD: 150 K/UL (ref 135–450)
PMV BLD AUTO: 10.4 FL (ref 5–10.5)
POTASSIUM SERPL-SCNC: 4.4 MMOL/L (ref 3.5–5.1)
RBC # BLD: 4.22 M/UL (ref 4.2–5.9)
SODIUM BLD-SCNC: 141 MMOL/L (ref 136–145)
TOTAL CK: 159 U/L (ref 39–308)
TOTAL PROTEIN: 6.6 G/DL (ref 6.4–8.2)
TRIGL SERPL-MCNC: 120 MG/DL (ref 0–150)
VLDLC SERPL CALC-MCNC: 24 MG/DL
WBC # BLD: 6.3 K/UL (ref 4–11)

## 2019-05-02 PROCEDURE — 85025 COMPLETE CBC W/AUTO DIFF WBC: CPT

## 2019-05-02 PROCEDURE — 80061 LIPID PANEL: CPT

## 2019-05-02 PROCEDURE — 84443 ASSAY THYROID STIM HORMONE: CPT

## 2019-05-02 PROCEDURE — 82550 ASSAY OF CK (CPK): CPT

## 2019-05-02 PROCEDURE — 83036 HEMOGLOBIN GLYCOSYLATED A1C: CPT

## 2019-05-02 PROCEDURE — 80053 COMPREHEN METABOLIC PANEL: CPT

## 2019-05-02 PROCEDURE — 36415 COLL VENOUS BLD VENIPUNCTURE: CPT

## 2019-05-03 LAB
ESTIMATED AVERAGE GLUCOSE: 205.9 MG/DL
HBA1C MFR BLD: 8.8 %
TSH SERPL DL<=0.05 MIU/L-ACNC: 2.2 UIU/ML (ref 0.27–4.2)

## 2019-07-08 RX ORDER — ATORVASTATIN CALCIUM 80 MG/1
TABLET, FILM COATED ORAL
Qty: 90 TABLET | Refills: 3 | Status: SHIPPED | OUTPATIENT
Start: 2019-07-08 | End: 2020-06-22 | Stop reason: SDUPTHER

## 2019-07-09 DIAGNOSIS — F41.9 ANXIETY: Primary | ICD-10-CM

## 2019-07-09 RX ORDER — LORAZEPAM 1 MG/1
1 TABLET ORAL NIGHTLY PRN
Qty: 90 TABLET | Refills: 0 | Status: SHIPPED | OUTPATIENT
Start: 2019-07-09 | End: 2019-08-08

## 2019-11-04 DIAGNOSIS — F41.9 ANXIETY: Primary | ICD-10-CM

## 2019-11-04 RX ORDER — LORAZEPAM 1 MG/1
1 TABLET ORAL NIGHTLY PRN
Qty: 90 TABLET | Refills: 0 | Status: SHIPPED | OUTPATIENT
Start: 2019-11-04 | End: 2020-02-02

## 2019-11-05 ENCOUNTER — TELEPHONE (OUTPATIENT)
Dept: INTERNAL MEDICINE CLINIC | Age: 79
End: 2019-11-05

## 2019-11-05 DIAGNOSIS — E11.9 TYPE 2 DIABETES MELLITUS WITHOUT COMPLICATION, WITHOUT LONG-TERM CURRENT USE OF INSULIN (HCC): Primary | ICD-10-CM

## 2020-03-09 RX ORDER — LORAZEPAM 1 MG/1
1 TABLET ORAL DAILY PRN
Qty: 90 TABLET | Refills: 0 | Status: SHIPPED | OUTPATIENT
Start: 2020-03-09 | End: 2020-06-07

## 2020-03-09 RX ORDER — LORAZEPAM 1 MG/1
1 TABLET ORAL EVERY 6 HOURS PRN
COMMUNITY
End: 2020-03-09 | Stop reason: SDUPTHER

## 2020-03-11 ENCOUNTER — HOSPITAL ENCOUNTER (OUTPATIENT)
Age: 80
Discharge: HOME | End: 2020-03-11
Payer: MEDICARE

## 2020-03-11 LAB
ANION GAP SERPL CALCULATED.3IONS-SCNC: 11 MMOL/L (ref 3–16)
BUN BLDV-MCNC: 28 MG/DL (ref 7–20)
CALCIUM SERPL-MCNC: 10.5 MG/DL (ref 8.3–10.6)
CHLORIDE BLD-SCNC: 100 MMOL/L (ref 99–110)
CO2: 27 MMOL/L (ref 21–32)
CREAT SERPL-MCNC: 0.9 MG/DL (ref 0.8–1.3)
GFR AFRICAN AMERICAN: >60
GFR NON-AFRICAN AMERICAN: >60
GLUCOSE BLD-MCNC: 121 MG/DL (ref 70–99)
POTASSIUM SERPL-SCNC: 4.1 MMOL/L (ref 3.5–5.1)
SODIUM BLD-SCNC: 138 MMOL/L (ref 136–145)

## 2020-03-11 PROCEDURE — 83036 HEMOGLOBIN GLYCOSYLATED A1C: CPT

## 2020-03-11 PROCEDURE — 80048 BASIC METABOLIC PNL TOTAL CA: CPT

## 2020-03-11 PROCEDURE — 36415 COLL VENOUS BLD VENIPUNCTURE: CPT

## 2020-03-12 LAB
ESTIMATED AVERAGE GLUCOSE: 180 MG/DL
HBA1C MFR BLD: 7.9 %

## 2020-06-22 ENCOUNTER — TELEPHONE (OUTPATIENT)
Dept: INTERNAL MEDICINE CLINIC | Age: 80
End: 2020-06-22

## 2020-06-22 RX ORDER — LOSARTAN POTASSIUM 100 MG/1
100 TABLET ORAL DAILY
COMMUNITY
End: 2020-06-22 | Stop reason: SDUPTHER

## 2020-06-22 NOTE — TELEPHONE ENCOUNTER
Not sure about his medicines. He just got Lantus. What is he supposed to be taking? Arnaldo Painter took care of all of that. Can we refill the medicines he should take?   [facility]

## 2020-06-23 RX ORDER — ATORVASTATIN CALCIUM 80 MG/1
TABLET, FILM COATED ORAL
Qty: 90 TABLET | Refills: 3 | Status: SHIPPED | OUTPATIENT
Start: 2020-06-23

## 2020-06-23 RX ORDER — LOSARTAN POTASSIUM 100 MG/1
100 TABLET ORAL DAILY
Qty: 90 TABLET | Refills: 3 | Status: SHIPPED | OUTPATIENT
Start: 2020-06-23

## 2020-06-23 RX ORDER — EZETIMIBE 10 MG/1
TABLET ORAL
Qty: 90 TABLET | Refills: 3 | Status: SHIPPED | OUTPATIENT
Start: 2020-06-23

## 2020-06-23 RX ORDER — FUROSEMIDE 40 MG/1
40 TABLET ORAL DAILY
Qty: 90 TABLET | Refills: 3 | Status: SHIPPED | OUTPATIENT
Start: 2020-06-23

## 2020-06-30 RX ORDER — LORAZEPAM 1 MG/1
1 TABLET ORAL DAILY PRN
Qty: 90 TABLET | Refills: 0 | Status: SHIPPED | OUTPATIENT
Start: 2020-06-30 | End: 2020-09-28

## 2020-06-30 RX ORDER — LORAZEPAM 1 MG/1
1 TABLET ORAL DAILY PRN
COMMUNITY
End: 2020-06-30 | Stop reason: SDUPTHER

## 2020-08-06 RX ORDER — INSULIN GLARGINE 100 [IU]/ML
INJECTION, SOLUTION SUBCUTANEOUS
Qty: 60 ML | Refills: 3 | Status: SHIPPED | OUTPATIENT
Start: 2020-08-06